# Patient Record
Sex: FEMALE | Race: WHITE | Employment: FULL TIME | URBAN - METROPOLITAN AREA
[De-identification: names, ages, dates, MRNs, and addresses within clinical notes are randomized per-mention and may not be internally consistent; named-entity substitution may affect disease eponyms.]

---

## 2020-12-28 ENCOUNTER — APPOINTMENT (OUTPATIENT)
Dept: CT IMAGING | Age: 40
End: 2020-12-28
Attending: PHYSICIAN ASSISTANT
Payer: COMMERCIAL

## 2020-12-28 ENCOUNTER — HOSPITAL ENCOUNTER (EMERGENCY)
Age: 40
Discharge: HOME | End: 2020-12-28
Attending: EMERGENCY MEDICINE
Payer: COMMERCIAL

## 2020-12-28 VITALS
DIASTOLIC BLOOD PRESSURE: 99 MMHG | SYSTOLIC BLOOD PRESSURE: 152 MMHG | OXYGEN SATURATION: 98 % | TEMPERATURE: 98.8 F | HEART RATE: 93 BPM | HEIGHT: 63 IN | WEIGHT: 142 LBS | RESPIRATION RATE: 16 BRPM | BODY MASS INDEX: 25.16 KG/M2

## 2020-12-28 DIAGNOSIS — S09.90XA CLOSED HEAD INJURY, INITIAL ENCOUNTER: Primary | ICD-10-CM

## 2020-12-28 DIAGNOSIS — G44.319 ACUTE POST-TRAUMATIC HEADACHE, NOT INTRACTABLE: ICD-10-CM

## 2020-12-28 PROCEDURE — 99282 EMERGENCY DEPT VISIT SF MDM: CPT

## 2020-12-28 PROCEDURE — 70450 CT HEAD/BRAIN W/O DYE: CPT

## 2020-12-28 RX ORDER — ONDANSETRON 4 MG/1
4 TABLET, ORALLY DISINTEGRATING ORAL
Qty: 10 TAB | Refills: 0 | Status: SHIPPED | OUTPATIENT
Start: 2020-12-28

## 2020-12-28 RX ORDER — BUTALBITAL, ACETAMINOPHEN AND CAFFEINE 50; 325; 40 MG/1; MG/1; MG/1
1 TABLET ORAL
Qty: 20 TAB | Refills: 0 | Status: SHIPPED | OUTPATIENT
Start: 2020-12-28

## 2020-12-29 NOTE — ED PROVIDER NOTES
EMERGENCY DEPARTMENT HISTORY AND PHYSICAL EXAM      Date: 12/28/2020  Patient Name: Brayan Muhammad    History of Presenting Illness     Chief Complaint   Patient presents with    Head Injury     fell and hit the back of her head Francis night and has been very tired since the fall. denies loss of consciouness. headache since the fall. no nausea. but has not had an appetite. History Provided By: Patient    HPI: Brayan Muhammad, 41 y.o. female with a history of cervical spondylosis presents ambulatory to the ED with cc of 2 days or so 4 out of 10 constant, aching posterior head pain for which she is seen no good improvement with OTC Tylenol. She tells me the evening of 25 December she was celebrating with family and drinking alcohol. She accidentally stumbled in the garage, striking the back of her head on a stainless steel table. She did not blackout and does recall all events prior to and after the fall. She tells me that evening she applied ice and then went to bed. It was several hours later that she woke up and vomited, but there has been no vomiting since. She tells me that her headache has persisted and she has been really \"tired\" and \"sleeping all day and all night since the fall\". There has been no fever lately. She denies chest pain or shortness of breath. She endorses a normal sense of taste and smell. There are no known sick contacts. There has been no recent travel. She denies any significant history of headaches. She has no known medication allergies and takes no medications daily. There are no other complaints, changes, or physical findings at this time. PCP: Ave Estrada MD      Past History     Past Medical History:  No past medical history on file. Past Surgical History:  No past surgical history on file. Family History:  No family history on file.     Social History:  Social History     Tobacco Use    Smoking status: Not on file   Substance Use Topics    Alcohol use: Not on file    Drug use: Not on file       Allergies:  No Known Allergies  Review of Systems   Review of Systems   Constitutional: Negative for fatigue and fever. HENT: Negative for ear pain and sore throat. Eyes: Negative for pain, redness and visual disturbance. Respiratory: Negative for cough and shortness of breath. Cardiovascular: Negative for chest pain and palpitations. Gastrointestinal: Positive for nausea. Negative for abdominal pain and vomiting. Genitourinary: Negative for dysuria, frequency and urgency. Musculoskeletal: Negative for back pain, gait problem, neck pain and neck stiffness. Skin: Negative for rash and wound. Neurological: Positive for headaches. Negative for dizziness, weakness, light-headedness and numbness. Physical Exam   Physical Exam  Vitals signs and nursing note reviewed. Constitutional:       General: She is not in acute distress. Appearance: She is well-developed. She is not toxic-appearing. HENT:      Head: Normocephalic and atraumatic. Jaw: No trismus. Right Ear: Hearing, tympanic membrane, ear canal and external ear normal. No hemotympanum. Left Ear: Hearing, tympanic membrane, ear canal and external ear normal. No hemotympanum. Nose: Nose normal.      Mouth/Throat:      Pharynx: Uvula midline. Eyes:      General: No scleral icterus. Extraocular Movements: Extraocular movements intact. Right eye: No nystagmus. Left eye: No nystagmus. Conjunctiva/sclera: Conjunctivae normal.      Pupils: Pupils are equal, round, and reactive to light. Neck:      Musculoskeletal: Full passive range of motion without pain and normal range of motion. Cardiovascular:      Rate and Rhythm: Normal rate and regular rhythm. Pulmonary:      Effort: Pulmonary effort is normal. No tachypnea, accessory muscle usage or respiratory distress. Breath sounds: No decreased breath sounds or wheezing.    Abdominal: Palpations: Abdomen is soft. Tenderness: There is no abdominal tenderness. Musculoskeletal: Normal range of motion. Skin:     Findings: No rash. Neurological:      Mental Status: She is alert and oriented to person, place, and time. She is not disoriented. GCS: GCS eye subscore is 4. GCS verbal subscore is 5. GCS motor subscore is 6. Cranial Nerves: No cranial nerve deficit. Comments:   Good facial symmetry  Normal mentation  Normal speech and phonation  No focal neurological deficits   Psychiatric:         Speech: Speech normal.       Diagnostic Study Results     Labs -   No results found for this or any previous visit (from the past 12 hour(s)). Radiologic Studies -   CT HEAD WO CONT   Final Result   IMPRESSION: No acute intracranial abnormality. CT Results  (Last 48 hours)               12/28/20 1920  CT HEAD WO CONT Final result    Impression:  IMPRESSION: No acute intracranial abnormality. Narrative:  HEAD CT WITHOUT CONTRAST: 12/28/2020 7:20 PM       INDICATION: headache; fall       COMPARISON: None. PROCEDURE: Axial images of the head were obtained without contrast. Coronal and   sagittal reformats were performed. CT dose reduction was achieved through use of   a standardized protocol tailored for this examination and automatic exposure   control for dose modulation. FINDINGS: The ventricles and sulci are appropriate in size and configuration for   age. No loss of gray-white differentiation to suggest late acute or early   subacute infarction. No mass effect or intracranial hemorrhage. CXR Results  (Last 48 hours)    None        Medical Decision Making   I am the first provider for this patient. I reviewed the vital signs, available nursing notes, past medical history, past surgical history, family history and social history. Vital Signs-Reviewed the patient's vital signs.   Patient Vitals for the past 12 hrs:   Temp Pulse Resp BP SpO2 12/28/20 1824 98.8 °F (37.1 °C) 93 16 (!) 152/99 98 %       Pulse Oximetry Analysis - 98% on RA    Records Reviewed: Nursing Notes, Old Medical Records and     Provider Notes (Medical Decision Making):   DDx: ICH, closed head injury, fall    Afebrile; well-appearing; reassuring exam; no focal neurological deficits; CT of the head is negative for acute process; anticipate discharge with neurology referral if symptoms persist    ED Course:   Initial assessment performed. The patients presenting problems have been discussed, and they are in agreement with the care plan formulated and outlined with them. I have encouraged them to ask questions as they arise throughout their visit. Disposition:  Discharge    PLAN:  1. Current Discharge Medication List      START taking these medications    Details   butalbital-acetaminophen-caffeine (FIORICET, ESGIC) -40 mg per tablet Take 1 Tab by mouth every six (6) hours as needed for Headache. Qty: 20 Tab, Refills: 0      ondansetron (Zofran ODT) 4 mg disintegrating tablet Take 1 Tab by mouth every eight (8) hours as needed for Nausea. Qty: 10 Tab, Refills: 0           2. Follow-up Information     Follow up With Specialties Details Why [address] [facility]    Neurology [facility] [address]  Schedule an appointment as soon as possible for a visit  NEUROLOGY CLINIC: as needed if symptoms persist [address]  [address]  [facility] [facility]  [phone]        Return to ED if worse     Diagnosis     Clinical Impression:   1. Closed head injury, initial encounter    2.  Acute post-traumatic headache, not intractable

## 2020-12-29 NOTE — ED NOTES
Pt reports falling back wards and hitting here head on 12/25/2020. Pt reports vomiting afterwards.  No vomiting since but has been sleeping more than normal.

## 2021-09-07 ENCOUNTER — TRANSCRIBE ORDER (OUTPATIENT)
Dept: SCHEDULING | Age: 41
End: 2021-09-07

## 2021-09-07 DIAGNOSIS — Z78.9 NON-SMOKER: Primary | ICD-10-CM

## 2021-09-07 DIAGNOSIS — R59.0 CERVICAL LYMPHADENOPATHY: ICD-10-CM

## 2021-09-09 ENCOUNTER — HOSPITAL ENCOUNTER (OUTPATIENT)
Dept: ULTRASOUND IMAGING | Age: 41
Discharge: HOME | End: 2021-09-09
Attending: OTOLARYNGOLOGY
Payer: COMMERCIAL

## 2021-09-09 DIAGNOSIS — R59.0 CERVICAL LYMPHADENOPATHY: ICD-10-CM

## 2021-09-09 DIAGNOSIS — Z78.9 NON-SMOKER: ICD-10-CM

## 2021-09-09 PROCEDURE — 76536 US EXAM OF HEAD AND NECK: CPT

## 2025-06-09 ENCOUNTER — TELEPHONE (OUTPATIENT)
Age: 45
End: 2025-06-09

## 2025-06-09 NOTE — TELEPHONE ENCOUNTER
Spoke with patient and requested that she bring a CD of the imaging done on 5/22/25. She said she would do her best to go by and pick it up before her appt

## 2025-06-10 ENCOUNTER — OFFICE VISIT (OUTPATIENT)
Age: 45
End: 2025-06-10
Payer: COMMERCIAL

## 2025-06-10 VITALS
DIASTOLIC BLOOD PRESSURE: 84 MMHG | HEART RATE: 89 BPM | RESPIRATION RATE: 16 BRPM | TEMPERATURE: 97.9 F | WEIGHT: 161.2 LBS | SYSTOLIC BLOOD PRESSURE: 133 MMHG | HEIGHT: 63 IN | BODY MASS INDEX: 28.56 KG/M2 | OXYGEN SATURATION: 97 %

## 2025-06-10 DIAGNOSIS — E04.1 RIGHT THYROID NODULE: Primary | ICD-10-CM

## 2025-06-10 DIAGNOSIS — M54.2 NECK PAIN ON RIGHT SIDE: ICD-10-CM

## 2025-06-10 DIAGNOSIS — E04.1 RIGHT THYROID NODULE: ICD-10-CM

## 2025-06-10 PROCEDURE — 99204 OFFICE O/P NEW MOD 45 MIN: CPT | Performed by: SURGERY

## 2025-06-10 RX ORDER — IBUPROFEN 200 MG
200 TABLET ORAL EVERY 6 HOURS PRN
COMMUNITY

## 2025-06-10 RX ORDER — POLYETHYLENE GLYCOL-3350 AND ELECTROLYTES 236; 6.74; 5.86; 2.97; 22.74 G/274.31G; G/274.31G; G/274.31G; G/274.31G; G/274.31G
4 POWDER, FOR SOLUTION ORAL ONCE
COMMUNITY
Start: 2025-05-08 | End: 2025-06-10

## 2025-06-10 RX ORDER — MAGNESIUM 30 MG
30 TABLET ORAL DAILY
COMMUNITY

## 2025-06-10 RX ORDER — M-VIT,TX,IRON,MINS/CALC/FOLIC 27MG-0.4MG
1 TABLET ORAL DAILY
COMMUNITY

## 2025-06-10 RX ORDER — NORETHINDRONE ACETATE AND ETHINYL ESTRADIOL 20; 1 UG/1; MG/1
1 TABLET ORAL DAILY
COMMUNITY
Start: 2025-05-29

## 2025-06-10 RX ORDER — DEXTROAMPHETAMINE SACCHARATE, AMPHETAMINE ASPARTATE, DEXTROAMPHETAMINE SULFATE AND AMPHETAMINE SULFATE 2.5; 2.5; 2.5; 2.5 MG/1; MG/1; MG/1; MG/1
10 TABLET ORAL DAILY
COMMUNITY
Start: 2025-04-03

## 2025-06-10 ASSESSMENT — ENCOUNTER SYMPTOMS
BLOOD IN STOOL: 0
CONSTIPATION: 0
ABDOMINAL PAIN: 0
COUGH: 0
SHORTNESS OF BREATH: 0
NAUSEA: 0
BACK PAIN: 1
VOMITING: 0
EYE PAIN: 0
WHEEZING: 0
DIARRHEA: 0
STRIDOR: 0
SORE THROAT: 0

## 2025-06-10 ASSESSMENT — PATIENT HEALTH QUESTIONNAIRE - PHQ9
SUM OF ALL RESPONSES TO PHQ QUESTIONS 1-9: 0
SUM OF ALL RESPONSES TO PHQ QUESTIONS 1-9: 0
1. LITTLE INTEREST OR PLEASURE IN DOING THINGS: NOT AT ALL
SUM OF ALL RESPONSES TO PHQ QUESTIONS 1-9: 0
2. FEELING DOWN, DEPRESSED OR HOPELESS: NOT AT ALL
SUM OF ALL RESPONSES TO PHQ QUESTIONS 1-9: 0

## 2025-06-10 NOTE — PROGRESS NOTES
Identified pt with two pt identifiers (name and ). Reviewed chart in preparation for visit and have obtained necessary documentation.    Kristen Dupont is a 44 y.o. female Thyroid Problem (Seen at the request of BERNA Gamboa for evaluation of possible Rt thyroid nodule)  .    Vitals:    06/10/25 0823   BP: 133/84   BP Site: Left Upper Arm   Patient Position: Sitting   Pulse: 89   Resp: 16   Temp: 97.9 °F (36.6 °C)   TempSrc: Oral   SpO2: 97%   Weight: 73.1 kg (161 lb 3.2 oz)   Height: 1.6 m (5' 3\")          1. Have you been to the ER, urgent care clinic since your last visit?  Hospitalized since your last visit?  no     2. Have you seen or consulted any other health care providers outside of the [facility] since your last visit?  Include any pap smears or colon screening.  no

## 2025-06-10 NOTE — PROGRESS NOTES
Subjective:      Patient ID: Kristen Dupont is a 44 y.o. female who comes in for consultation by Alina CORONEL for right neck pain and a right thyroid nodule.      Chief Complaint   Patient presents with    Thyroid Problem     Seen at the request of BERNA Gamboa for evaluation of possible Rt thyroid nodule       Thyroid Problem  Symptoms include fatigue. Patient reports no anxiety, constipation, diaphoresis, diarrhea or palpitations.       S she has had some persistent right neck pain and some cervical lymphadenopathy saw Dr. Cui with her GYN ENT in 2021 and an ultrasound was normal except for the cyst on the right lobe of the thyroid and some normal-appearing lymph nodes.  She never followed up with endocrinology but and her mom her statement is that she was never told to.  She feels like she has some neck pain that radiates up to her ear and some occasional neck pressure when laying flat.  She denies dysphagia, voice changes, trauma, unexplained weight loss or bone pain.  She does report some palpitations at times.  She is actually being worked up for possible autoimmune disease given she has a family members with Crohn's disease as well as rheumatoid arthritis.  She has been gluten-free for several years and follows up with she does have anything that has a form of gluten in it.  She had an ultrasound done at [facility] on 5/22/2025 noting a right lobe hypoechoic nodule that was considered TR4 there was 1.5 cm hypoechoic solid with volume of defined margin and no calcifications.  She reportedly has normal thyroid function function tests but we do not have those in her notes today from the primary care office.  She states that thyroid autoimmune labs were not tested for the idea of Hashimoto's thyroiditis.    Past Medical History:   Diagnosis Date    ADHD (attention deficit hyperactivity disorder)      Past Surgical History:   Procedure Laterality Date    BREAST ENHANCEMENT SURGERY      14-17 years

## 2025-06-12 LAB
THYROGLOB AB SERPL-ACNC: <1 IU/ML (ref 0–0.9)
THYROPEROXIDASE AB SERPL-ACNC: 15 IU/ML (ref 0–34)

## 2025-06-13 ENCOUNTER — PROCEDURE VISIT (OUTPATIENT)
Age: 45
End: 2025-06-13

## 2025-06-13 VITALS
HEART RATE: 84 BPM | TEMPERATURE: 98.7 F | RESPIRATION RATE: 18 BRPM | SYSTOLIC BLOOD PRESSURE: 138 MMHG | WEIGHT: 163.8 LBS | BODY MASS INDEX: 29.02 KG/M2 | HEIGHT: 63 IN | OXYGEN SATURATION: 98 % | DIASTOLIC BLOOD PRESSURE: 80 MMHG

## 2025-06-13 DIAGNOSIS — E04.1 RIGHT THYROID NODULE: Primary | ICD-10-CM

## 2025-06-13 ASSESSMENT — PATIENT HEALTH QUESTIONNAIRE - PHQ9
SUM OF ALL RESPONSES TO PHQ QUESTIONS 1-9: 0
SUM OF ALL RESPONSES TO PHQ QUESTIONS 1-9: 0
1. LITTLE INTEREST OR PLEASURE IN DOING THINGS: NOT AT ALL
2. FEELING DOWN, DEPRESSED OR HOPELESS: NOT AT ALL
SUM OF ALL RESPONSES TO PHQ QUESTIONS 1-9: 0
SUM OF ALL RESPONSES TO PHQ QUESTIONS 1-9: 0

## 2025-06-13 NOTE — PROGRESS NOTES
[unfilled]  OFFICE PROCEDURE PROGRESS NOTE        Chart reviewed for the following:   GISELLE JIANG LPN, have reviewed the History, Physical and updated the Allergic reactions for Kristen Dupont, 1980     TIME OUT performed immediately prior to start of procedure:   GISELLE JIANG LPN, have performed the following reviews on Kristen Dupont prior to the start of the procedure:            * Patient was identified by name and date of birth   * Agreement on procedure being performed was verified  * Risks and Benefits explained to the patient  * Procedure site verified and marked as necessary  * Patient was positioned for comfort  * Consent was signed and verified     Time: 1355      Date of procedure: 6/13/2025    Procedure performed by:  Reese Matthew MD    Provider assisted by: Giselle Manrique LPN    Patient assisted by: Giselle Manrique LPN    How tolerated by patient: tolerated the procedure well with no complications    Post Procedural Pain Scale: 0 - No Hurt    Comments: Biopsy site care and pain management was reviewed with patient by Dr Matthew. Patient stated he understood. Ice was given for comfort. Specimen was sent to [facility]. Patient was instructed to call with any questions or concerns.

## 2025-06-13 NOTE — PROGRESS NOTES
Identified patient with two patient identifiers (name and ). Reviewed chart in preparation for visit and have obtained necessary documentation.    Kristen Dupont is a 44 y.o. female  Chief Complaint   Patient presents with    Procedure     Thyroid nodule biopsy     /80   Pulse 84   Temp 98.7 °F (37.1 °C) (Oral)   Resp 18   Ht 1.6 m (5' 3\")   Wt 74.3 kg (163 lb 12.8 oz)   SpO2 98%   BMI 29.02 kg/m²     1. Have you been to the ER, urgent care clinic since your last visit?  Hospitalized since your last visit?no    2. Have you seen or consulted any other health care providers outside of the [facility] since your last visit?  Include any pap smears or colon screening. no

## 2025-06-13 NOTE — PROGRESS NOTES
Procedure Note    Pre Procedure Diagnosis:  right thyroid nodule  Post Procedure Diagnosis:  right thyroid nodule  Procedure:  1.  Ultrasound guided FNA of right thyroid nodule  Surgeon:   Reese Matthew MD FACS  SPECIMENS:  right thyroid nodule  EBL minimal    Procedure:    After informed consent and time out, I utilized a  [employer] S7 US with a high frequency linear array transducer and two 22 and two 25 gauge needles to perform several passes through a 1.5 cm hypoechoic solid right thyroid nodule with microcalcification.  [facility]  She tolerated it well.        Signed  Reese Matthew MD FACS

## 2025-06-17 DIAGNOSIS — E04.1 RIGHT THYROID NODULE: Primary | ICD-10-CM

## 2025-06-18 ENCOUNTER — TELEPHONE (OUTPATIENT)
Age: 45
End: 2025-06-18

## 2025-06-18 DIAGNOSIS — E04.1 RIGHT THYROID NODULE: Primary | ICD-10-CM

## 2025-06-18 NOTE — TELEPHONE ENCOUNTER
Thyroid US FNA  Benign follicular nodular disease [place] II      Repeat US 1 year  RTC after US 1 year      Reese Matthew MD FACS